# Patient Record
Sex: FEMALE | Race: OTHER | HISPANIC OR LATINO | Employment: FULL TIME | URBAN - METROPOLITAN AREA
[De-identification: names, ages, dates, MRNs, and addresses within clinical notes are randomized per-mention and may not be internally consistent; named-entity substitution may affect disease eponyms.]

---

## 2018-01-19 ENCOUNTER — OFFICE VISIT (OUTPATIENT)
Dept: OBSTETRICS AND GYNECOLOGY | Facility: CLINIC | Age: 53
End: 2018-01-19

## 2018-01-19 VITALS
WEIGHT: 145 LBS | BODY MASS INDEX: 26.68 KG/M2 | SYSTOLIC BLOOD PRESSURE: 122 MMHG | HEIGHT: 62 IN | DIASTOLIC BLOOD PRESSURE: 70 MMHG

## 2018-01-19 DIAGNOSIS — Z00.00 ANNUAL PHYSICAL EXAM: Primary | ICD-10-CM

## 2018-01-19 DIAGNOSIS — N95.1 PERIMENOPAUSAL VASOMOTOR SYMPTOMS: ICD-10-CM

## 2018-01-19 LAB
B-HCG UR QL: NEGATIVE
BILIRUB BLD-MCNC: NEGATIVE MG/DL
CLARITY, POC: CLEAR
COLOR UR: YELLOW
GLUCOSE UR STRIP-MCNC: NEGATIVE MG/DL
INTERNAL NEGATIVE CONTROL: NEGATIVE
INTERNAL POSITIVE CONTROL: POSITIVE
KETONES UR QL: NEGATIVE
LEUKOCYTE EST, POC: NEGATIVE
Lab: 0.32
NITRITE UR-MCNC: NEGATIVE MG/ML
PH UR: 5 [PH] (ref 5–8)
PROT UR STRIP-MCNC: NEGATIVE MG/DL
RBC # UR STRIP: NEGATIVE /UL
SP GR UR: 1 (ref 1–1.03)
UROBILINOGEN UR QL: NORMAL

## 2018-01-19 PROCEDURE — 81025 URINE PREGNANCY TEST: CPT | Performed by: OBSTETRICS & GYNECOLOGY

## 2018-01-19 PROCEDURE — 81002 URINALYSIS NONAUTO W/O SCOPE: CPT | Performed by: OBSTETRICS & GYNECOLOGY

## 2018-01-19 PROCEDURE — 99386 PREV VISIT NEW AGE 40-64: CPT | Performed by: OBSTETRICS & GYNECOLOGY

## 2018-01-19 PROCEDURE — 99213 OFFICE O/P EST LOW 20 MIN: CPT | Performed by: OBSTETRICS & GYNECOLOGY

## 2018-01-19 RX ORDER — ESTRADIOL 1 MG/1
1 TABLET ORAL DAILY
Qty: 90 TABLET | Refills: 3 | Status: SHIPPED | OUTPATIENT
Start: 2018-01-19 | End: 2018-03-23 | Stop reason: SDUPTHER

## 2018-01-19 NOTE — PROGRESS NOTES
GYN Annual Exam     CC- Here for annual exam.     Dianna Gould is a 52 y.o. female who presents for annual well woman exam. Pt is a new patient. Periods are irregular, lasting 4 days. Will skip months as long as 3 months at a time. Dysmenorrhea:none. Cyclic symptoms include none. No intermenstrual bleeding, spotting, or discharge. Vasomotor symptoms: yes. Pt has no hx of HRT use. Having vasomotor symptoms at night and causing her to wake at night.    OB History      Para Term  AB Living    3 3 3   3    SAB TAB Ectopic Multiple Live Births        3          Current contraception: perimenopause  History of abnormal Pap smear: no  History of abnormal mammogram: no  Family history of uterine, colon or ovarian cancer: no  Family history of breast cancer: no    Health Maintenance   Topic Date Due   • TDAP/TD VACCINES (1 - Tdap) 1983   • INFLUENZA VACCINE  2017   • HEPATITIS C SCREENING  2018   • MAMMOGRAM  2018   • PAP SMEAR  2018   • COLONOSCOPY  2018       History reviewed. No pertinent past medical history.    History reviewed. No pertinent surgical history.      Current Outpatient Prescriptions:   •  Ped Multivitamins-Fl-Iron (MULTIVITAMIN WITH FLUORIDE/IRON) 0.25-10 MG/ML solution solution, Take  by mouth Daily., Disp: , Rfl:   •  estradiol (ESTRACE) 1 MG tablet, Take 1 tablet by mouth Daily., Disp: 90 tablet, Rfl: 3  •  progesterone (PROMETRIUM) 100 MG capsule, Take 1 capsule by mouth Every Night., Disp: 90 capsule, Rfl: 3    No Known Allergies    Social History   Substance Use Topics   • Smoking status: Never Smoker   • Smokeless tobacco: None   • Alcohol use No       History reviewed. No pertinent family history.    Review of Systems   Constitutional: Negative for unexpected weight change.   Gastrointestinal: Negative for abdominal distention and abdominal pain.   Endocrine: Negative for heat intolerance.   Genitourinary: Negative for menstrual problem,  "pelvic pain, vaginal bleeding and vaginal discharge.       /70  Ht 157.5 cm (62\")  Wt 65.8 kg (145 lb)  Breastfeeding? No  BMI 26.52 kg/m2    Physical Exam   Constitutional: She is oriented to person, place, and time. She appears well-developed and well-nourished.   HENT:   Mouth/Throat: Normal dentition. No dental caries.   Cardiovascular: Normal rate and regular rhythm.    Pulmonary/Chest: Effort normal and breath sounds normal. Right breast exhibits no inverted nipple, no mass, no nipple discharge, no skin change and no tenderness. Left breast exhibits no inverted nipple, no mass, no nipple discharge, no skin change and no tenderness.   Abdominal: Soft. She exhibits no distension and no mass. There is no tenderness.   Genitourinary: There is no rash, tenderness or lesion on the right labia. There is no rash, tenderness or lesion on the left labia. Uterus is not deviated, not enlarged, not fixed and not tender. Cervix exhibits no motion tenderness, no discharge and no friability. Right adnexum displays no mass, no tenderness and no fullness. Left adnexum displays no mass, no tenderness and no fullness. No tenderness or bleeding in the vagina. No vaginal discharge found.   Neurological: She is alert and oriented to person, place, and time.   Psychiatric: She has a normal mood and affect. Her behavior is normal. Judgment and thought content normal.   Vitals reviewed.         Assessment/Plan    1) GYN HM: Check pap smear. Check MMG. Had a Cologard test. HAs not had a colonoscopy.  SBE demonstrated and encouraged.  2) Perimenopause: having vasomotor symptoms. Desires treatment. Discussed Brisdelle versus HRT. Pt desires HRT. Rx estradiol 1mg and prometrium 100mg qhs. FU 8 weeks for fu.  3) Bone health - Weight bearing exercise, dietary calcium recommendations and vitamin D reviewed.   4) Diet and Exercise discussed  5) Smoking Status: nonsmoker  6) Social: Has a 9 year old son. Has a daughter that is a " missionary and another daughter that is a farmer.  7) Follow up prn and 1 year       Diagnoses and all orders for this visit:    Annual physical exam  -     POC Urinalysis Dipstick  -     POC Pregnancy, Urine  -     Pap IG, HPV-hr - ThinPrep Vial, Cervix  -     Mammo Screening Bilateral With CAD; Future    Perimenopausal vasomotor symptoms    Other orders  -     Ped Multivitamins-Fl-Iron (MULTIVITAMIN WITH FLUORIDE/IRON) 0.25-10 MG/ML solution solution; Take  by mouth Daily.  -     estradiol (ESTRACE) 1 MG tablet; Take 1 tablet by mouth Daily.  -     progesterone (PROMETRIUM) 100 MG capsule; Take 1 capsule by mouth Every Night.        Lali Peter, DO  1/20/2018  4:57 PM

## 2018-01-23 LAB
CYTOLOGIST CVX/VAG CYTO: NORMAL
CYTOLOGY CVX/VAG DOC THIN PREP: NORMAL
DX ICD CODE: NORMAL
HIV 1 & 2 AB SER-IMP: NORMAL
HPV I/H RISK 1 DNA CVX QL PROBE+SIG AMP: NEGATIVE
OTHER STN SPEC: NORMAL
PATH REPORT.FINAL DX SPEC: NORMAL
STAT OF ADQ CVX/VAG CYTO-IMP: NORMAL

## 2018-02-06 ENCOUNTER — HOSPITAL ENCOUNTER (OUTPATIENT)
Dept: MAMMOGRAPHY | Facility: HOSPITAL | Age: 53
Discharge: HOME | End: 2018-02-06
Attending: OBSTETRICS & GYNECOLOGY | Admitting: OBSTETRICS & GYNECOLOGY

## 2018-02-06 DIAGNOSIS — Z00.00 ANNUAL PHYSICAL EXAM: ICD-10-CM

## 2018-02-06 PROCEDURE — 77067 SCR MAMMO BI INCL CAD: CPT

## 2018-03-23 ENCOUNTER — OFFICE VISIT (OUTPATIENT)
Dept: OBSTETRICS AND GYNECOLOGY | Facility: CLINIC | Age: 53
End: 2018-03-23

## 2018-03-23 VITALS
SYSTOLIC BLOOD PRESSURE: 122 MMHG | HEIGHT: 62 IN | BODY MASS INDEX: 26.13 KG/M2 | DIASTOLIC BLOOD PRESSURE: 70 MMHG | WEIGHT: 142 LBS

## 2018-03-23 DIAGNOSIS — N95.1 PERIMENOPAUSAL VASOMOTOR SYMPTOMS: Primary | ICD-10-CM

## 2018-03-23 PROCEDURE — 99213 OFFICE O/P EST LOW 20 MIN: CPT | Performed by: OBSTETRICS & GYNECOLOGY

## 2018-03-23 RX ORDER — ESTRADIOL 1 MG/1
1 TABLET ORAL DAILY
Qty: 90 TABLET | Refills: 3 | Status: SHIPPED | OUTPATIENT
Start: 2018-03-23 | End: 2018-04-16 | Stop reason: SDUPTHER

## 2018-03-23 NOTE — PROGRESS NOTES
"PROBLEM VISIT    Chief Complaint: Vasomotor symptoms fu      Dianna Gould is a 52 y.o. patient who presents for follow up of HRT. Pt is doing well on HRT. Pt is happy with the meds and states her vasomotor symptoms have resolved. Pt has less of a foggy brain. Pt is still struggling with sleeping and wakes up numerous times and cannot fall back to sleep. Pt has taken Ambien in the past and desires a script. She gets too tired on Benadryl.  Chief Complaint   Patient presents with   • Follow-up             The following portions of the patient's history were reviewed and updated as appropriate: allergies, current medications and problem list.    Review of Systems   Constitutional: Positive for fatigue.   Endocrine: Negative for cold intolerance and heat intolerance.   Genitourinary: Negative for vaginal bleeding.       /70   Ht 157.5 cm (62\")   Wt 64.4 kg (142 lb)   LMP 03/12/2018   Breastfeeding? No   BMI 25.97 kg/m²     Physical Exam   Constitutional: She is oriented to person, place, and time. She appears well-developed and well-nourished.   Neurological: She is alert and oriented to person, place, and time.   Psychiatric: She has a normal mood and affect. Her behavior is normal. Judgment and thought content normal.   Vitals reviewed.        Assessment/Plan   Dianna was seen today for follow-up.    Diagnoses and all orders for this visit:    Perimenopausal vasomotor symptoms      51yo for fu of vasomotor symptoms and difficulty sleeping    1) gyn HM: LPS/MMG 1/2018    2) vasomotor symptoms: Doing very well on HRT and desires to continue. BP normal. No PMPB. Refills given.    3) Fatigue: pt is still struggling w sleep. Wakes numerous times ancannot get back to sleep. Has taken Ambien in the past and desires a script to take prn. Rx Ambien 5mg po qhs #30. No refills.             No Follow-up on file.      Lali Peter,     3/23/2018  9:50 AM  "

## 2018-04-17 RX ORDER — ESTRADIOL 1 MG/1
1 TABLET ORAL DAILY
Qty: 90 TABLET | Refills: 3 | Status: SHIPPED | OUTPATIENT
Start: 2018-04-17 | End: 2019-01-22 | Stop reason: SDUPTHER

## 2019-01-22 ENCOUNTER — OFFICE VISIT (OUTPATIENT)
Dept: OBSTETRICS AND GYNECOLOGY | Facility: CLINIC | Age: 54
End: 2019-01-22

## 2019-01-22 VITALS
WEIGHT: 143 LBS | SYSTOLIC BLOOD PRESSURE: 118 MMHG | BODY MASS INDEX: 26.31 KG/M2 | DIASTOLIC BLOOD PRESSURE: 62 MMHG | HEIGHT: 62 IN

## 2019-01-22 DIAGNOSIS — N95.1 VASOMOTOR SYMPTOMS DUE TO MENOPAUSE: ICD-10-CM

## 2019-01-22 DIAGNOSIS — Z13.9 SCREENING FOR CONDITION: ICD-10-CM

## 2019-01-22 DIAGNOSIS — Z01.419 ENCOUNTER FOR GYNECOLOGICAL EXAMINATION WITHOUT ABNORMAL FINDING: Primary | ICD-10-CM

## 2019-01-22 LAB
BILIRUB BLD-MCNC: NEGATIVE MG/DL
CLARITY, POC: CLEAR
COLOR UR: YELLOW
GLUCOSE UR STRIP-MCNC: NEGATIVE MG/DL
KETONES UR QL: NEGATIVE
LEUKOCYTE EST, POC: NEGATIVE
NITRITE UR-MCNC: NEGATIVE MG/ML
PH UR: 5 [PH] (ref 5–8)
PROT UR STRIP-MCNC: NEGATIVE MG/DL
RBC # UR STRIP: NEGATIVE /UL
SP GR UR: 1 (ref 1–1.03)
UROBILINOGEN UR QL: NORMAL

## 2019-01-22 PROCEDURE — 81002 URINALYSIS NONAUTO W/O SCOPE: CPT | Performed by: OBSTETRICS & GYNECOLOGY

## 2019-01-22 PROCEDURE — 99396 PREV VISIT EST AGE 40-64: CPT | Performed by: OBSTETRICS & GYNECOLOGY

## 2019-01-22 RX ORDER — ESTRADIOL 1 MG/1
1 TABLET ORAL DAILY
Qty: 90 TABLET | Refills: 3 | Status: SHIPPED | OUTPATIENT
Start: 2019-01-22 | End: 2019-03-15

## 2019-01-22 NOTE — PROGRESS NOTES
GYN Annual Exam     CC- Here for annual exam.     Dianna Gould is a 53 y.o. female who presents for annual well woman exam. Pt was having irregular cycles approx every 3 months. Last cycle was 2018 and was a normal cycle. Pt is on HRT and doing very well. No abnormal bleeding. Pt desires to continue HRT. Pt has done Cologard but never had a colonoscopy.     OB History      Para Term  AB Living    3 3 3     3    SAB TAB Ectopic Molar Multiple Live Births              3          Current contraception: post menopausal status  History of abnormal Pap smear: no  History of abnormal mammogram: no  Family history of uterine, colon or ovarian cancer: no  Family history of breast cancer: no        Health Maintenance   Topic Date Due   • TDAP/TD VACCINES (1 - Tdap) 1983   • ZOSTER VACCINE (1 of 2) 2014   • HEPATITIS C SCREENING  2018   • COLONOSCOPY  2018   • INFLUENZA VACCINE  2018   • ANNUAL PHYSICAL  2019   • MAMMOGRAM  2020   • PAP SMEAR  2021       History reviewed. No pertinent past medical history.    History reviewed. No pertinent surgical history.      Current Outpatient Medications:   •  cefdinir (OMNICEF) 300 MG capsule, Take 1 capsule by mouth 2 (Two) Times a Day., Disp: 20 capsule, Rfl: 0  •  estradiol (ESTRACE) 1 MG tablet, Take 1 tablet by mouth Daily., Disp: 90 tablet, Rfl: 3  •  fluticasone (FLONASE) 50 MCG/ACT nasal spray, 2 sprays into each nostril Daily., Disp: 1 bottle, Rfl: 0  •  Meloxicam (MOBIC PO), Take  by mouth., Disp: , Rfl:   •  Ped Multivitamins-Fl-Iron (MULTIVITAMIN WITH FLUORIDE/IRON) 0.25-10 MG/ML solution solution, Take  by mouth Daily., Disp: , Rfl:   •  progesterone (PROMETRIUM) 100 MG capsule, Take 1 capsule by mouth Every Night., Disp: 90 capsule, Rfl: 3    No Known Allergies    Social History     Tobacco Use   • Smoking status: Never Smoker   • Smokeless tobacco: Never Used   Substance Use Topics   • Alcohol use: No  "  • Drug use: No       Family History   Problem Relation Age of Onset   • No Known Problems Mother    • No Known Problems Father    • No Known Problems Sister    • No Known Problems Brother    • No Known Problems Son    • No Known Problems Daughter    • No Known Problems Maternal Grandmother    • No Known Problems Maternal Grandfather    • No Known Problems Paternal Grandmother    • No Known Problems Paternal Grandfather    • No Known Problems Cousin        Review of Systems   Constitutional: Negative for unexpected weight change.   Gastrointestinal: Negative for abdominal distention, abdominal pain, anal bleeding, blood in stool, constipation and diarrhea.   Genitourinary: Negative for dyspareunia, menstrual problem, pelvic pain, vaginal bleeding, vaginal discharge and vaginal pain.       /62   Ht 157.5 cm (62\")   Wt 64.9 kg (143 lb)   LMP 03/12/2018   BMI 26.16 kg/m²     Physical Exam   Constitutional: She is oriented to person, place, and time. She appears well-developed and well-nourished.   HENT:   Mouth/Throat: Normal dentition. No dental caries.   Cardiovascular: Normal rate and regular rhythm.   Pulmonary/Chest: Effort normal and breath sounds normal. Right breast exhibits no inverted nipple, no mass, no nipple discharge, no skin change and no tenderness. Left breast exhibits no inverted nipple, no mass, no nipple discharge, no skin change and no tenderness.   Abdominal: Soft. She exhibits no distension and no mass. There is no tenderness.   Genitourinary: There is no rash, tenderness or lesion on the right labia. There is no rash, tenderness or lesion on the left labia. Uterus is not deviated, not enlarged, not fixed and not tender. Cervix exhibits no motion tenderness, no discharge and no friability. Right adnexum displays no mass, no tenderness and no fullness. Left adnexum displays no mass, no tenderness and no fullness. No tenderness or bleeding in the vagina. No vaginal discharge found. "   Neurological: She is alert and oriented to person, place, and time.   Psychiatric: She has a normal mood and affect. Her behavior is normal. Judgment and thought content normal.   Vitals reviewed.         Assessment/Plan    1) GYN HM: LPS 1/2018 normal w neg HR HPV   SBE demonstrated and encouraged.  2) PerimenopauseMenopausal: On HRT and doing well. No PMPB.  3) Bone health - Weight bearing exercise, dietary calcium recommendations and vitamin D reviewed.   4) Diet and Exercise discussed  5) Smoking Status: nonsmoker  6) Social: Daughter was a missionary in [facility]. 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 Pt has a 10 year old son. Has another daughter that is a farmer.  7) Follow up prn and 1 year       Diagnoses and all orders for this visit:    Encounter for gynecological examination without abnormal finding    Screening for condition  -     POC Urinalysis Dipstick  -     Cancel: POC Urinalysis Dipstick        Lali Peter,   1/22/2019  12:52 PM

## 2019-02-05 ENCOUNTER — HOSPITAL ENCOUNTER (OUTPATIENT)
Dept: MAMMOGRAPHY | Facility: HOSPITAL | Age: 54
Discharge: HOME | End: 2019-02-05
Admitting: OBSTETRICS & GYNECOLOGY

## 2019-02-05 DIAGNOSIS — Z01.419 ENCOUNTER FOR GYNECOLOGICAL EXAMINATION WITHOUT ABNORMAL FINDING: ICD-10-CM

## 2019-02-05 PROCEDURE — 77067 SCR MAMMO BI INCL CAD: CPT

## 2019-03-15 RX ORDER — ESTRADIOL 1 MG/1
TABLET ORAL
Qty: 90 TABLET | Refills: 3 | Status: SHIPPED | OUTPATIENT
Start: 2019-03-15 | End: 2020-01-28 | Stop reason: SDUPTHER

## 2020-01-28 ENCOUNTER — OFFICE VISIT (OUTPATIENT)
Dept: OBSTETRICS AND GYNECOLOGY | Facility: CLINIC | Age: 55
End: 2020-01-28

## 2020-01-28 VITALS
SYSTOLIC BLOOD PRESSURE: 108 MMHG | BODY MASS INDEX: 27.05 KG/M2 | DIASTOLIC BLOOD PRESSURE: 70 MMHG | HEIGHT: 62 IN | WEIGHT: 147 LBS

## 2020-01-28 DIAGNOSIS — Z01.419 WELL WOMAN EXAM: Primary | ICD-10-CM

## 2020-01-28 DIAGNOSIS — N95.1 VASOMOTOR SYMPTOMS DUE TO MENOPAUSE: ICD-10-CM

## 2020-01-28 PROCEDURE — 81002 URINALYSIS NONAUTO W/O SCOPE: CPT | Performed by: OBSTETRICS & GYNECOLOGY

## 2020-01-28 PROCEDURE — 99396 PREV VISIT EST AGE 40-64: CPT | Performed by: OBSTETRICS & GYNECOLOGY

## 2020-01-28 RX ORDER — ESTRADIOL 1 MG/1
1 TABLET ORAL DAILY
Qty: 90 TABLET | Refills: 3 | Status: SHIPPED | OUTPATIENT
Start: 2020-01-28 | End: 2020-05-26 | Stop reason: SDUPTHER

## 2020-01-28 NOTE — PROGRESS NOTES
GYN Annual Exam     CC- Here for annual exam.     Dianna Gould is a 54 y.o. female who presents for annual well woman exam.  Patient has not had a cycle since 2018.  Patient is on HRT and doing very well.  She desires to continue estradiol 1 mg p.o. daily and Prometrium 100 mg p.o. nightly.  She denies any postmenopausal bleeding.  She denies any vasomotor symptoms.  She has had no change in her past medical history or her past family history.    OB History        3    Para   3    Term   3            AB        Living   3       SAB        TAB        Ectopic        Molar        Multiple        Live Births   3                Current contraception: post menopausal status  History of abnormal Pap smear: no  History of abnormal mammogram: no  Family history of uterine, colon or ovarian cancer: no  Family history of breast cancer: no    Health Maintenance   Topic Date Due   • ZOSTER VACCINE (1 of 2) 2014   • HEPATITIS C SCREENING  2018   • COLONOSCOPY  2018   • ANNUAL PHYSICAL  2019   • INFLUENZA VACCINE  2019   • Annual Gynecologic Pelvic and Breast Exam  2021   • MAMMOGRAM  2021   • PAP SMEAR  2022   • TDAP/TD VACCINES (2 - Td) 03/10/2022       History reviewed. No pertinent past medical history.    History reviewed. No pertinent surgical history.      Current Outpatient Medications:   •  estradiol (ESTRACE) 1 MG tablet, Take 1 tablet by mouth Daily., Disp: 90 tablet, Rfl: 3  •  Ped Multivitamins-Fl-Iron (MULTIVITAMIN WITH FLUORIDE/IRON) 0.25-10 MG/ML solution solution, Take  by mouth Daily., Disp: , Rfl:   •  progesterone (PROMETRIUM) 100 MG capsule, Take 1 capsule by mouth Every Night., Disp: 30 capsule, Rfl: 11    No Known Allergies    Social History     Tobacco Use   • Smoking status: Never Smoker   • Smokeless tobacco: Never Used   Substance Use Topics   • Alcohol use: No   • Drug use: No       Family History   Problem Relation Age of Onset  "  • No Known Problems Mother    • No Known Problems Father    • No Known Problems Sister    • No Known Problems Brother    • No Known Problems Son    • No Known Problems Daughter    • No Known Problems Maternal Grandmother    • No Known Problems Maternal Grandfather    • No Known Problems Paternal Grandmother    • No Known Problems Paternal Grandfather    • No Known Problems Cousin    • Breast cancer Neg Hx        Review of Systems   Constitutional: Negative for appetite change, chills, fatigue, fever and unexpected weight change.   Gastrointestinal: Negative for abdominal distention, abdominal pain, anal bleeding, blood in stool, constipation, diarrhea, nausea and vomiting.   Genitourinary: Negative for dyspareunia, dysuria, menstrual problem, pelvic pain, vaginal bleeding, vaginal discharge and vaginal pain.       /70   Ht 157.5 cm (62\")   Wt 66.7 kg (147 lb)   LMP 03/12/2018   BMI 26.89 kg/m²     Physical Exam   Constitutional: She is oriented to person, place, and time. She appears well-developed and well-nourished.   HENT:   Mouth/Throat: Normal dentition. No dental caries.   Cardiovascular: Normal rate, regular rhythm and normal heart sounds.   Pulmonary/Chest: Effort normal and breath sounds normal. No stridor. No respiratory distress. She has no wheezes. Right breast exhibits no inverted nipple, no mass, no nipple discharge, no skin change and no tenderness. Left breast exhibits no inverted nipple, no mass, no nipple discharge, no skin change and no tenderness.   Abdominal: Soft. She exhibits no distension and no mass. There is no tenderness.   Genitourinary: There is no rash, tenderness or lesion on the right labia. There is no rash, tenderness or lesion on the left labia. Uterus is not deviated, not enlarged, not fixed and not tender. Cervix exhibits no motion tenderness, no discharge and no friability. Right adnexum displays no mass, no tenderness and no fullness. Left adnexum displays no mass, " no tenderness and no fullness. No tenderness or bleeding in the vagina. No vaginal discharge found.   Musculoskeletal: Normal range of motion. She exhibits no edema or tenderness.   Neurological: She is alert and oriented to person, place, and time. No cranial nerve deficit. Coordination normal.   Skin: Skin is warm. No rash noted. No erythema.   Psychiatric: She has a normal mood and affect. Her behavior is normal. Judgment and thought content normal.   Vitals reviewed.         Assessment/Plan    1) GYN HM: LPS 1/2018 normal w neg HR HPV. Last MMG 2/2019. SBE demonstrated and encouraged.  Has never had a colonoscopy.  Cologuard is negative patient.  2) :  On HRT and doing well. No PMPB.  No vasomotor symptoms.  3) Bone health - Weight bearing exercise, dietary calcium recommendations and vitamin D reviewed.   4) Diet and Exercise discussed  5) Smoking Status: nonsmoker  6) Social: Daughter was a missionary in [facility]. 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  Pt has an 11 year old son. Has another daughter that is dairy to a farmer  7) Follow up prn and 1 year       Diagnoses and all orders for this visit:    Well woman exam  -     POC Urinalysis Dipstick    Vasomotor symptoms due to menopause    Other orders  -     estradiol (ESTRACE) 1 MG tablet; Take 1 tablet by mouth Daily.  -     progesterone (PROMETRIUM) 100 MG capsule; Take 1 capsule by mouth Every Night.        Lali Peter, DO  1/28/2020  2:13 PM

## 2020-05-26 RX ORDER — ESTRADIOL 1 MG/1
1 TABLET ORAL DAILY
Qty: 90 TABLET | Refills: 3 | Status: SHIPPED | OUTPATIENT
Start: 2020-05-26 | End: 2021-03-25 | Stop reason: SDUPTHER

## 2021-03-26 RX ORDER — ESTRADIOL 1 MG/1
1 TABLET ORAL DAILY
Qty: 90 TABLET | Refills: 0 | Status: SHIPPED | OUTPATIENT
Start: 2021-03-26 | End: 2021-03-30 | Stop reason: SDUPTHER

## 2021-03-30 ENCOUNTER — OFFICE VISIT (OUTPATIENT)
Dept: OBSTETRICS AND GYNECOLOGY | Facility: CLINIC | Age: 56
End: 2021-03-30

## 2021-03-30 VITALS
SYSTOLIC BLOOD PRESSURE: 126 MMHG | HEIGHT: 63 IN | WEIGHT: 146.2 LBS | DIASTOLIC BLOOD PRESSURE: 86 MMHG | BODY MASS INDEX: 25.91 KG/M2

## 2021-03-30 DIAGNOSIS — Z01.419 ROUTINE GYNECOLOGICAL EXAMINATION: Primary | ICD-10-CM

## 2021-03-30 DIAGNOSIS — N95.1 VASOMOTOR SYMPTOMS DUE TO MENOPAUSE: ICD-10-CM

## 2021-03-30 DIAGNOSIS — Z13.9 SCREENING FOR UNSPECIFIED CONDITION: ICD-10-CM

## 2021-03-30 DIAGNOSIS — Z11.51 SPECIAL SCREENING EXAMINATION FOR HUMAN PAPILLOMAVIRUS (HPV): ICD-10-CM

## 2021-03-30 DIAGNOSIS — Z01.419 PAP SMEAR, LOW-RISK: ICD-10-CM

## 2021-03-30 PROCEDURE — 81002 URINALYSIS NONAUTO W/O SCOPE: CPT | Performed by: OBSTETRICS & GYNECOLOGY

## 2021-03-30 PROCEDURE — 99396 PREV VISIT EST AGE 40-64: CPT | Performed by: OBSTETRICS & GYNECOLOGY

## 2021-03-30 RX ORDER — ESTRADIOL 1 MG/1
1 TABLET ORAL DAILY
Qty: 90 TABLET | Refills: 3 | Status: SHIPPED | OUTPATIENT
Start: 2021-03-30 | End: 2022-04-10

## 2021-03-30 NOTE — PROGRESS NOTES
GYN Annual Exam     CC- Here for annual exam.     Dianna Gould is a 55 y.o. female who presents for annual well woman exam. Patient has not had a cycle since 2018.  Patient is on HRT and doing very well.  She desires to continue estradiol 1 mg p.o. daily and Prometrium 100 mg p.o. nightly.  She denies any postmenopausal bleeding.  She denies any vasomotor symptoms.  She has had no change in her past medical history or her past family history.    OB History        3    Para   3    Term   3            AB        Living   3       SAB        TAB        Ectopic        Molar        Multiple        Live Births   3              Current contraception: post menopausal status  History of abnormal Pap smear: no  History of abnormal mammogram: no  Family history of uterine, colon or ovarian cancer: no  Family history of breast cancer: no    Health Maintenance   Topic Date Due   • COLONOSCOPY  Never done   • ZOSTER VACCINE (1 of 2) Never done   • HEPATITIS C SCREENING  Never done   • ANNUAL PHYSICAL  2019   • INFLUENZA VACCINE  Never done   • Annual Gynecologic Pelvic and Breast Exam  2021   • MAMMOGRAM  2021   • PAP SMEAR  2022   • TDAP/TD VACCINES (2 - Td) 03/10/2022   • Pneumococcal Vaccine 0-64  Aged Out   • MENINGOCOCCAL VACCINE  Aged Out       History reviewed. No pertinent past medical history.    History reviewed. No pertinent surgical history.      Current Outpatient Medications:   •  estradiol (ESTRACE) 1 MG tablet, Take 1 tablet by mouth Daily., Disp: 90 tablet, Rfl: 3  •  Ped Multivitamins-Fl-Iron (MULTIVITAMIN WITH FLUORIDE/IRON) 0.25-10 MG/ML solution solution, Take  by mouth Daily., Disp: , Rfl:   •  progesterone (PROMETRIUM) 100 MG capsule, Take 1 capsule by mouth Every Night., Disp: 90 capsule, Rfl: 3    No Known Allergies    Social History     Tobacco Use   • Smoking status: Never Smoker   • Smokeless tobacco: Never Used   Substance Use Topics   • Alcohol use:  "No   • Drug use: No       Family History   Problem Relation Age of Onset   • No Known Problems Mother    • No Known Problems Father    • No Known Problems Sister    • No Known Problems Brother    • No Known Problems Son    • No Known Problems Daughter    • No Known Problems Maternal Grandmother    • No Known Problems Maternal Grandfather    • No Known Problems Paternal Grandmother    • No Known Problems Paternal Grandfather    • No Known Problems Cousin    • Breast cancer Neg Hx        Review of Systems   Constitutional: Negative for appetite change, chills, fatigue, fever and unexpected weight change.   Gastrointestinal: Negative for abdominal distention, abdominal pain, anal bleeding, blood in stool, constipation, diarrhea, nausea and vomiting.   Genitourinary: Negative for dyspareunia, dysuria, menstrual problem, pelvic pain, vaginal bleeding, vaginal discharge and vaginal pain.       /86   Ht 158.8 cm (62.5\")   Wt 66.3 kg (146 lb 3.2 oz)   LMP 03/12/2018   BMI 26.31 kg/m²     Physical Exam  Vitals reviewed.   Constitutional:       Appearance: She is well-developed.   HENT:      Mouth/Throat:      Dentition: Normal dentition. No dental caries.   Cardiovascular:      Rate and Rhythm: Normal rate and regular rhythm.      Heart sounds: Normal heart sounds.   Pulmonary:      Effort: Pulmonary effort is normal. No respiratory distress.      Breath sounds: Normal breath sounds. No stridor. No wheezing.   Chest:      Breasts:         Right: No inverted nipple, mass, nipple discharge, skin change or tenderness.         Left: No inverted nipple, mass, nipple discharge, skin change or tenderness.   Abdominal:      General: There is no distension.      Palpations: Abdomen is soft. There is no mass.      Tenderness: There is no abdominal tenderness.   Genitourinary:     Labia:         Right: No rash, tenderness or lesion.         Left: No rash, tenderness or lesion.       Vagina: No vaginal discharge, tenderness or " bleeding.      Cervix: No cervical motion tenderness, discharge or friability.      Uterus: Not deviated, not enlarged, not fixed and not tender.       Adnexa:         Right: No mass, tenderness or fullness.          Left: No mass, tenderness or fullness.     Musculoskeletal:         General: No tenderness. Normal range of motion.   Skin:     General: Skin is warm.      Findings: No erythema or rash.   Neurological:      Mental Status: She is alert and oriented to person, place, and time.      Cranial Nerves: No cranial nerve deficit.      Coordination: Coordination normal.   Psychiatric:         Behavior: Behavior normal.         Thought Content: Thought content normal.         Judgment: Judgment normal.            Assessment/Plan    1) GYN HM: LPS 1/2018 normal w neg HR HPV. Last MMG 2/2019. SBE demonstrated and encouraged.  Has never had a colonoscopy.  Cologuard is negative patient.  2) :  On HRT and doing well. No PMPB.  No vasomotor symptoms. Pt does not want to start weaning this year.  3) Bone health - Weight bearing exercise, dietary calcium recommendations and vitamin D reviewed.   4) Diet and Exercise discussed  5) Smoking Status: nonsmoker  6) Social: Daughter was a missionary in [facility]. 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  Pt has an 12 year old son. Has another daughter that is dairy to a farmer  7) Follow up prn and 1 year       Diagnoses and all orders for this visit:    Routine gynecological examination  -     IgP, Aptima HPV    Screening for unspecified condition  -     POC Urinalysis Dipstick  -     Mammo Screening Bilateral With CAD; Future    Pap smear, low-risk  -     IgP, Aptima HPV    Special screening examination for human papillomavirus (HPV)  -     IgP, Aptima HPV    Vasomotor symptoms due to menopause    Other orders  -     estradiol (ESTRACE) 1 MG tablet; Take 1 tablet by mouth Daily.  -      progesterone (PROMETRIUM) 100 MG capsule; Take 1 capsule by mouth Every Night.        Lali Peter DO  3/30/2021  16:20 EDT

## 2021-04-01 LAB
CYTOLOGIST CVX/VAG CYTO: NORMAL
CYTOLOGY CVX/VAG DOC CYTO: NORMAL
CYTOLOGY CVX/VAG DOC THIN PREP: NORMAL
DX ICD CODE: NORMAL
HIV 1 & 2 AB SER-IMP: NORMAL
HPV I/H RISK 4 DNA CVX QL PROBE+SIG AMP: NEGATIVE
OTHER STN SPEC: NORMAL
STAT OF ADQ CVX/VAG CYTO-IMP: NORMAL

## 2021-05-06 ENCOUNTER — HOSPITAL ENCOUNTER (OUTPATIENT)
Dept: MAMMOGRAPHY | Facility: HOSPITAL | Age: 56
Discharge: HOME | End: 2021-05-06
Admitting: OBSTETRICS & GYNECOLOGY

## 2021-05-06 DIAGNOSIS — Z13.9 SCREENING FOR UNSPECIFIED CONDITION: ICD-10-CM

## 2021-05-06 PROCEDURE — 77067 SCR MAMMO BI INCL CAD: CPT

## 2021-05-06 PROCEDURE — 77063 BREAST TOMOSYNTHESIS BI: CPT

## 2021-11-10 RX ORDER — PROGESTERONE 100 MG/1
100 CAPSULE ORAL NIGHTLY
Qty: 30 CAPSULE | Refills: 0 | Status: SHIPPED | OUTPATIENT
Start: 2021-11-10 | End: 2022-04-12

## 2022-01-19 ENCOUNTER — OFFICE VISIT (OUTPATIENT)
Dept: OBSTETRICS AND GYNECOLOGY | Facility: CLINIC | Age: 57
End: 2022-01-19

## 2022-01-19 VITALS
WEIGHT: 146 LBS | DIASTOLIC BLOOD PRESSURE: 72 MMHG | SYSTOLIC BLOOD PRESSURE: 106 MMHG | HEIGHT: 63 IN | BODY MASS INDEX: 25.87 KG/M2

## 2022-01-19 DIAGNOSIS — N95.1 VASOMOTOR SYMPTOMS DUE TO MENOPAUSE: ICD-10-CM

## 2022-01-19 DIAGNOSIS — Z12.31 ENCOUNTER FOR SCREENING MAMMOGRAM FOR MALIGNANT NEOPLASM OF BREAST: ICD-10-CM

## 2022-01-19 DIAGNOSIS — Z13.9 SCREENING FOR CONDITION: Primary | ICD-10-CM

## 2022-01-19 DIAGNOSIS — R31.9 HEMATURIA, UNSPECIFIED TYPE: ICD-10-CM

## 2022-01-19 DIAGNOSIS — Z79.890 HORMONE REPLACEMENT THERAPY (HRT): ICD-10-CM

## 2022-01-19 LAB
BILIRUB BLD-MCNC: NEGATIVE MG/DL
CLARITY, POC: CLEAR
COLOR UR: YELLOW
GLUCOSE UR STRIP-MCNC: NEGATIVE MG/DL
KETONES UR QL: NEGATIVE
LEUKOCYTE EST, POC: NEGATIVE
NITRITE UR-MCNC: NEGATIVE MG/ML
PH UR: 5 [PH] (ref 5–8)
PROT UR STRIP-MCNC: NEGATIVE MG/DL
RBC # UR STRIP: ABNORMAL /UL
SP GR UR: 1 (ref 1–1.03)
UROBILINOGEN UR QL: NORMAL

## 2022-01-19 PROCEDURE — 81002 URINALYSIS NONAUTO W/O SCOPE: CPT | Performed by: OBSTETRICS & GYNECOLOGY

## 2022-01-19 PROCEDURE — 99213 OFFICE O/P EST LOW 20 MIN: CPT | Performed by: OBSTETRICS & GYNECOLOGY

## 2022-01-19 NOTE — PROGRESS NOTES
"      Dianna Gould is a 56 y.o. patient who presents for follow up of   Chief Complaint   Patient presents with   • Hot Flashes       56-year-old established patient of the practice who is new to me presents for discussion of hot flashes.  She had severe hot flashes and insomnia and was started on Estrace 1 mg daily and Prometrium 100 mg daily.  She did well with that dosage for a long time and her hot flashes were better, however, she still had issues with insomnia.  She had some trouble getting her HRT refilled through the pharmacy and took about a 10-day break.  She notes that during that time she slept \"like a baby\".  She is now wondering if changing or decreasing her hormone dose may help her sleep better.  She ultimately would like to get off hormone replacement therapy but is concerned about hair loss in addition to hot flashes.  She also has hematuria today on her urine dip and has not noticed any urinary symptoms      The following portions of the patient's history were reviewed and updated as appropriate: allergies, current medications and problem list.    Review of Systems   Constitutional: Positive for activity change.   Endocrine: Positive for heat intolerance.   Psychiatric/Behavioral: Positive for sleep disturbance.   All other systems reviewed and are negative.      /72   Ht 158.8 cm (62.5\")   Wt 66.2 kg (146 lb)   LMP 03/12/2018   BMI 26.28 kg/m²     Physical Exam  Vitals and nursing note reviewed.   Constitutional:       Appearance: Normal appearance. She is well-developed.   HENT:      Head: Normocephalic and atraumatic.   Neck:      Thyroid: No thyromegaly.   Skin:     General: Skin is warm and dry.   Neurological:      Mental Status: She is alert and oriented to person, place, and time.   Psychiatric:         Mood and Affect: Mood normal.         Behavior: Behavior normal.         Thought Content: Thought content normal.         Judgment: Judgment normal.         A/P:  1. HRT-she " has longstanding history of insomnia which briefly improved when she took a 10-day break from HRT.  She would like to decrease her dose and see if that helps her sleep better.  Her hot flashes are improved after restarting HRT.  We will decreased her to Estrace 0.5 mg, half a 1 mg tablet by mouth daily.  She will continue on Prometrium 100 mg p.o. nightly.  She will follow-up with Dr. Peter for her annual in early April and recheck of her symptoms.  She is already taking biotin for hair loss.  We also discussed that the COVID pandemic has been a common cause of hair loss.  2.  Hematuria- check UA and CS. Pt asymptomatic  3. MMG UTD 5/2021 B1- sched MMG 5/2022  4. RTO 4/2022 Dr Peter annual and recheck symptoms.     Assessment/Plan   Diagnoses and all orders for this visit:    1. Screening for condition (Primary)  -     POC Urinalysis Dipstick    2. Encounter for screening mammogram for malignant neoplasm of breast  -     Mammo Screening Bilateral With CAD; Future    3. Hematuria, unspecified type  -     Urinalysis With Microscopic - Urine, Clean Catch  -     Urine Culture - Urine, Urine, Clean Catch    4. Vasomotor symptoms due to menopause    5. Hormone replacement therapy (HRT)                 No follow-ups on file.      Katarzyna Lancaster MD    1/19/2022  16:05 EST

## 2022-01-20 LAB
APPEARANCE UR: CLEAR
BACTERIA #/AREA URNS HPF: NORMAL /[HPF]
BACTERIA UR CULT: NORMAL
BACTERIA UR CULT: NORMAL
BILIRUB UR QL STRIP: NEGATIVE
CASTS URNS QL MICRO: NORMAL /LPF
COLOR UR: YELLOW
EPI CELLS #/AREA URNS HPF: NORMAL /HPF (ref 0–10)
GLUCOSE UR QL STRIP: NEGATIVE
HGB UR QL STRIP: NEGATIVE
KETONES UR QL STRIP: NEGATIVE
LEUKOCYTE ESTERASE UR QL STRIP: NEGATIVE
MICRO URNS: NORMAL
MICRO URNS: NORMAL
NITRITE UR QL STRIP: NEGATIVE
PH UR STRIP: 6.5 [PH] (ref 5–7.5)
PROT UR QL STRIP: NEGATIVE
RBC #/AREA URNS HPF: NORMAL /HPF (ref 0–2)
SP GR UR STRIP: 1.01 (ref 1–1.03)
UROBILINOGEN UR STRIP-MCNC: 0.2 MG/DL (ref 0.2–1)
WBC #/AREA URNS HPF: NORMAL /HPF (ref 0–5)

## 2022-04-10 RX ORDER — ESTRADIOL 1 MG/1
TABLET ORAL
Qty: 90 TABLET | Refills: 0 | Status: SHIPPED | OUTPATIENT
Start: 2022-04-10 | End: 2022-10-14

## 2022-04-12 ENCOUNTER — OFFICE VISIT (OUTPATIENT)
Dept: OBSTETRICS AND GYNECOLOGY | Facility: CLINIC | Age: 57
End: 2022-04-12

## 2022-04-12 VITALS
WEIGHT: 146 LBS | BODY MASS INDEX: 25.87 KG/M2 | DIASTOLIC BLOOD PRESSURE: 64 MMHG | HEIGHT: 63 IN | SYSTOLIC BLOOD PRESSURE: 110 MMHG

## 2022-04-12 DIAGNOSIS — Z01.419 PAP SMEAR, LOW-RISK: ICD-10-CM

## 2022-04-12 DIAGNOSIS — Z01.419 ROUTINE GYNECOLOGICAL EXAMINATION: Primary | ICD-10-CM

## 2022-04-12 DIAGNOSIS — N95.1 VASOMOTOR SYMPTOMS DUE TO MENOPAUSE: ICD-10-CM

## 2022-04-12 DIAGNOSIS — Z11.51 SPECIAL SCREENING EXAMINATION FOR HUMAN PAPILLOMAVIRUS (HPV): ICD-10-CM

## 2022-04-12 DIAGNOSIS — Z12.11 COLON CANCER SCREENING: ICD-10-CM

## 2022-04-12 LAB
BILIRUB BLD-MCNC: NEGATIVE MG/DL
CLARITY, POC: CLEAR
COLOR UR: YELLOW
GLUCOSE UR STRIP-MCNC: NEGATIVE MG/DL
KETONES UR QL: NEGATIVE
LEUKOCYTE EST, POC: NEGATIVE
NITRITE UR-MCNC: NEGATIVE MG/ML
PH UR: 5 [PH] (ref 5–8)
PROT UR STRIP-MCNC: NEGATIVE MG/DL
RBC # UR STRIP: NEGATIVE /UL
SP GR UR: 1.03 (ref 1–1.03)
UROBILINOGEN UR QL: NORMAL

## 2022-04-12 PROCEDURE — 81002 URINALYSIS NONAUTO W/O SCOPE: CPT | Performed by: OBSTETRICS & GYNECOLOGY

## 2022-04-12 PROCEDURE — 99396 PREV VISIT EST AGE 40-64: CPT | Performed by: OBSTETRICS & GYNECOLOGY

## 2022-04-12 RX ORDER — ZOLPIDEM TARTRATE 6.25 MG/1
6.25 TABLET, FILM COATED, EXTENDED RELEASE ORAL
COMMUNITY
Start: 2022-03-14

## 2022-04-12 RX ORDER — PROGESTERONE 100 MG/1
100 CAPSULE ORAL DAILY
Qty: 90 CAPSULE | Refills: 3 | Status: SHIPPED | OUTPATIENT
Start: 2022-04-12 | End: 2022-06-22

## 2022-04-12 NOTE — PROGRESS NOTES
GYN Annual Exam     CC- Here for annual exam.     Dianna Gould is a 56 y.o. female who presents for annual well woman exam. Patient has not had a cycle since 2018.  Patient is on HRT and doing very well.  She desires to continue estradiol 1 mg p.o. daily and Prometrium 100 mg p.o. nightly.  She denies any postmenopausal bleeding.  She denies any vasomotor symptoms.  She has had no change in her past medical history or her past family history. Pt is reporting that she is struggling to drink thin liquids- she starts choking- 4x per week over the last month.    OB History        3    Para   3    Term   3            AB        Living   3       SAB        IAB        Ectopic        Molar        Multiple        Live Births   3              Current contraception: post menopausal status  History of abnormal Pap smear: no  History of abnormal mammogram: no  Family history of uterine, colon or ovarian cancer: no  Family history of breast cancer: no    Health Maintenance   Topic Date Due   • COLORECTAL CANCER SCREENING  Never done   • ZOSTER VACCINE (1 of 2) Never done   • HEPATITIS C SCREENING  Never done   • ANNUAL PHYSICAL  2019   • COVID-19 Vaccine (3 - Booster for Pfizer series) 2021   • TDAP/TD VACCINES (2 - Td or Tdap) 03/10/2022   • Annual Gynecologic Pelvic and Breast Exam  2022   • INFLUENZA VACCINE  2022   • MAMMOGRAM  2023   • PAP SMEAR  2024   • Pneumococcal Vaccine 0-64  Aged Out       History reviewed. No pertinent past medical history.    History reviewed. No pertinent surgical history.      Current Outpatient Medications:   •  estradiol (ESTRACE) 1 MG tablet, TAKE 1 TABLET BY MOUTH EVERY DAY, Disp: 90 tablet, Rfl: 0  •  progesterone (PROMETRIUM) 100 MG capsule, Take 1 capsule by mouth Every Night., Disp: 90 capsule, Rfl: 3  •  Progesterone (Prometrium) 100 MG capsule, Take 1 capsule by mouth Daily., Disp: 90 capsule, Rfl: 3  •  zolpidem CR (AMBIEN  "CR) 6.25 MG CR tablet, Take 6.25 mg by mouth every night at bedtime., Disp: , Rfl:     No Known Allergies    Social History     Tobacco Use   • Smoking status: Never Smoker   • Smokeless tobacco: Never Used   Substance Use Topics   • Alcohol use: No   • Drug use: No       Family History   Problem Relation Age of Onset   • No Known Problems Mother    • No Known Problems Father    • No Known Problems Sister    • No Known Problems Brother    • No Known Problems Son    • No Known Problems Daughter    • No Known Problems Maternal Grandmother    • No Known Problems Maternal Grandfather    • No Known Problems Paternal Grandmother    • No Known Problems Paternal Grandfather    • No Known Problems Cousin    • Breast cancer Neg Hx        Review of Systems   Constitutional: Negative for appetite change, chills, fatigue, fever and unexpected weight change.   Gastrointestinal: Negative for abdominal distention, abdominal pain, anal bleeding, blood in stool, constipation, diarrhea, nausea and vomiting.   Genitourinary: Negative for dyspareunia, dysuria, menstrual problem, pelvic pain, vaginal bleeding, vaginal discharge and vaginal pain.       /64   Ht 158.8 cm (62.5\")   Wt 66.2 kg (146 lb)   LMP 03/12/2018   BMI 26.28 kg/m²     Physical Exam  Vitals reviewed.   Constitutional:       Appearance: She is well-developed.   HENT:      Mouth/Throat:      Dentition: Normal dentition. No dental caries.   Cardiovascular:      Rate and Rhythm: Normal rate and regular rhythm.      Heart sounds: Normal heart sounds.   Pulmonary:      Effort: Pulmonary effort is normal. No respiratory distress.      Breath sounds: Normal breath sounds. No stridor. No wheezing.   Chest:   Breasts:      Right: No inverted nipple, mass, nipple discharge, skin change or tenderness.      Left: No inverted nipple, mass, nipple discharge, skin change or tenderness.       Abdominal:      General: There is no distension.      Palpations: Abdomen is soft. " There is no mass.      Tenderness: There is no abdominal tenderness.   Genitourinary:     Labia:         Right: No rash, tenderness or lesion.         Left: No rash, tenderness or lesion.       Vagina: No vaginal discharge, tenderness or bleeding.      Cervix: No cervical motion tenderness, discharge or friability.      Uterus: Not deviated, not enlarged, not fixed and not tender.       Adnexa:         Right: No mass, tenderness or fullness.          Left: No mass, tenderness or fullness.     Musculoskeletal:         General: No tenderness. Normal range of motion.   Skin:     General: Skin is warm.      Findings: No erythema or rash.   Neurological:      Mental Status: She is alert and oriented to person, place, and time.      Cranial Nerves: No cranial nerve deficit.      Coordination: Coordination normal.   Psychiatric:         Behavior: Behavior normal.         Thought Content: Thought content normal.         Judgment: Judgment normal.            Assessment/Plan    1) GYN HM: Check pap smear. Last MMG 5/2021. SBE demonstrated and encouraged.  Has never had a colonoscopy.  ColoGuard is negative. Due for another ColoGuard  2) :  On HRT and doing well. No PMPB. No vasomotor symptoms. Pt agrees to start weaning this year.   3) Bone health - Weight bearing exercise, dietary calcium recommendations and vitamin D reviewed.   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  Pt has an 12 year old son. Has another daughter that is dairy to a farmer.  7) Choking on liquids: recommend speech pathology for swallow study  8) Follow up prn and 1 year       Diagnoses and all orders for this visit:    Routine gynecological examination  -     POC Urinalysis Dipstick  -     IgP, Aptima HPV    Pap smear, low-risk  -     IgP, Aptima HPV    Special screening examination for human  papillomavirus (HPV)  -     IgP, Aptima HPV    Vasomotor symptoms due to menopause    Colon cancer screening  -     Cologuard - Stool, Per Rectum; Future    Other orders  -     zolpidem CR (AMBIEN CR) 6.25 MG CR tablet; Take 6.25 mg by mouth every night at bedtime.  -     Progesterone (Prometrium) 100 MG capsule; Take 1 capsule by mouth Daily.        Lali Peter DO  4/12/2022  14:17 EDT

## 2022-04-19 LAB
CYTOLOGIST CVX/VAG CYTO: NORMAL
CYTOLOGY CVX/VAG DOC CYTO: NORMAL
CYTOLOGY CVX/VAG DOC THIN PREP: NORMAL
DX ICD CODE: NORMAL
HIV 1 & 2 AB SER-IMP: NORMAL
HPV I/H RISK 4 DNA CVX QL PROBE+SIG AMP: NEGATIVE
Lab: NORMAL
OTHER STN SPEC: NORMAL
STAT OF ADQ CVX/VAG CYTO-IMP: NORMAL

## 2022-05-10 ENCOUNTER — HOSPITAL ENCOUNTER (OUTPATIENT)
Dept: MAMMOGRAPHY | Facility: HOSPITAL | Age: 57
Discharge: HOME | End: 2022-05-10
Admitting: OBSTETRICS & GYNECOLOGY

## 2022-05-10 DIAGNOSIS — Z12.31 ENCOUNTER FOR SCREENING MAMMOGRAM FOR MALIGNANT NEOPLASM OF BREAST: ICD-10-CM

## 2022-05-10 PROCEDURE — 77067 SCR MAMMO BI INCL CAD: CPT

## 2022-05-10 PROCEDURE — 77063 BREAST TOMOSYNTHESIS BI: CPT

## 2022-06-22 RX ORDER — PROGESTERONE 100 MG/1
CAPSULE ORAL
Qty: 90 CAPSULE | Refills: 3 | Status: SHIPPED | OUTPATIENT
Start: 2022-06-22

## 2022-10-14 RX ORDER — ESTRADIOL 1 MG/1
TABLET ORAL
Qty: 90 TABLET | Refills: 0 | Status: SHIPPED | OUTPATIENT
Start: 2022-10-14

## 2023-05-19 RX ORDER — ESTRADIOL 1 MG/1
TABLET ORAL
Qty: 90 TABLET | Refills: 0 | Status: SHIPPED | OUTPATIENT
Start: 2023-05-19 | End: 2023-05-22

## 2023-05-22 RX ORDER — ESTRADIOL 1 MG/1
TABLET ORAL
Qty: 90 TABLET | Refills: 0 | Status: SHIPPED | OUTPATIENT
Start: 2023-05-22

## 2023-05-30 ENCOUNTER — OFFICE VISIT (OUTPATIENT)
Dept: OBSTETRICS AND GYNECOLOGY | Facility: CLINIC | Age: 58
End: 2023-05-30

## 2023-05-30 VITALS
SYSTOLIC BLOOD PRESSURE: 124 MMHG | BODY MASS INDEX: 27.79 KG/M2 | HEIGHT: 62 IN | WEIGHT: 151 LBS | DIASTOLIC BLOOD PRESSURE: 70 MMHG

## 2023-05-30 DIAGNOSIS — Z13.89 SCREENING FOR GENITOURINARY CONDITION: ICD-10-CM

## 2023-05-30 DIAGNOSIS — Z11.51 SPECIAL SCREENING EXAMINATION FOR HUMAN PAPILLOMAVIRUS (HPV): ICD-10-CM

## 2023-05-30 DIAGNOSIS — N95.1 VASOMOTOR SYMPTOMS DUE TO MENOPAUSE: ICD-10-CM

## 2023-05-30 DIAGNOSIS — Z01.419 ROUTINE GYNECOLOGICAL EXAMINATION: Primary | ICD-10-CM

## 2023-05-30 DIAGNOSIS — Z01.419 PAP SMEAR, LOW-RISK: ICD-10-CM

## 2023-05-30 DIAGNOSIS — N88.8 NABOTHIAN CYST: ICD-10-CM

## 2023-05-30 RX ORDER — PROGESTERONE 100 MG/1
100 CAPSULE ORAL
Qty: 90 CAPSULE | Refills: 3 | Status: SHIPPED | OUTPATIENT
Start: 2023-05-30

## 2023-05-30 RX ORDER — ESTRADIOL 1 MG/1
1 TABLET ORAL DAILY
Qty: 90 TABLET | Refills: 3 | Status: SHIPPED | OUTPATIENT
Start: 2023-05-30

## 2023-05-30 NOTE — PROGRESS NOTES
"GYN Annual Exam     CC- Here for annual exam.     Dianna Gould is a 57 y.o. female who presents for annual well woman exam. She is a former patient of Dr. Peter and is new to me. Menopausal since 2018.  Patient is on HRT and doing very well.  She desires to continue estradiol 1 mg p.o. daily and Prometrium 100 mg p.o. nightly. She worked on cutting her HRT back over the past few months. She was doing well on 0.5mg Estradiol and then her hot flashes returned. She has since restarted doing the 1mg daily. She reports I understand the risk of hormones and states, \"for me it is quality of life or quantity.\"     She denies any postmenopausal bleeding.  She denies any vasomotor symptoms.  She has had no change in her past medical history or her past family history.     She is struggling with weight loss. She report she works out and eats \"super healthy.\"  She does not understand why she is gaining weight.       OB History        3    Para   3    Term   3            AB        Living   3       SAB        IAB        Ectopic        Molar        Multiple        Live Births   3              MM/22  Pap:  NIL   Dexa: has not had   Colonoscopy: Declines, reports having a neg cologuard a few years ago   Current contraception: post menopausal status  History of abnormal Pap smear: no  History of abnormal mammogram: no  Family history of uterine, colon or ovarian cancer: no  Family history of breast cancer: no    Health Maintenance   Topic Date Due   • COLORECTAL CANCER SCREENING  Never done   • ZOSTER VACCINE (1 of 2) Never done   • HEPATITIS C SCREENING  Never done   • ANNUAL PHYSICAL  2019   • COVID-19 Vaccine (3 - Booster for Pfizer series) 2021   • TDAP/TD VACCINES (2 - Td or Tdap) 03/10/2022   • Annual Gynecologic Pelvic and Breast Exam  2023   • INFLUENZA VACCINE  2023   • MAMMOGRAM  05/10/2024   • PAP SMEAR  2025   • Pneumococcal Vaccine 0-64  Aged Out       No " "past medical history on file.    No past surgical history on file.      Current Outpatient Medications:   •  estradiol (ESTRACE) 1 MG tablet, TAKE 1 TABLET BY MOUTH EVERY DAY, Disp: 90 tablet, Rfl: 0  •  progesterone (PROMETRIUM) 100 MG capsule, Take 1 capsule by mouth Every Night., Disp: 90 capsule, Rfl: 3  •  zolpidem CR (AMBIEN CR) 6.25 MG CR tablet, Take 1 tablet by mouth every night at bedtime., Disp: , Rfl:   •  Progesterone (PROMETRIUM) 100 MG capsule, TAKE 1 CAPSULE BY MOUTH EVERY NIGHT AT BEDTIME (Patient not taking: Reported on 5/30/2023), Disp: 90 capsule, Rfl: 3    No Known Allergies    Social History     Tobacco Use   • Smoking status: Never   • Smokeless tobacco: Never   Substance Use Topics   • Alcohol use: No   • Drug use: No       Family History   Problem Relation Age of Onset   • No Known Problems Mother    • No Known Problems Father    • No Known Problems Sister    • No Known Problems Brother    • No Known Problems Daughter    • No Known Problems Son    • No Known Problems Maternal Grandmother    • No Known Problems Paternal Grandmother    • No Known Problems Maternal Grandfather    • No Known Problems Paternal Grandfather    • No Known Problems Cousin    • Breast cancer Neg Hx    • Ovarian cancer Neg Hx        Review of Systems   Constitutional: Negative for appetite change, chills, fatigue, fever and unexpected weight change.   Cardiovascular: Negative.    Gastrointestinal: Negative for abdominal distention, abdominal pain, anal bleeding, blood in stool, constipation, diarrhea, nausea and vomiting.   Endocrine: Positive for heat intolerance (hot flashes).   Genitourinary: Negative for dyspareunia, dysuria, menstrual problem, pelvic pain, vaginal bleeding, vaginal discharge and vaginal pain.   Musculoskeletal: Negative.    Psychiatric/Behavioral: Positive for decreased concentration.        Brain fog        /70   Ht 157.5 cm (62\")   Wt 68.5 kg (151 lb)   LMP 03/12/2018   Breastfeeding " No   BMI 27.62 kg/m²     Physical Exam  Vitals reviewed.   Constitutional:       Appearance: She is well-developed.   HENT:      Mouth/Throat:      Dentition: Normal dentition. No dental caries.   Cardiovascular:      Rate and Rhythm: Normal rate and regular rhythm.      Heart sounds: Normal heart sounds.   Pulmonary:      Effort: Pulmonary effort is normal. No respiratory distress.      Breath sounds: Normal breath sounds. No stridor. No wheezing.   Chest:   Breasts:     Right: No inverted nipple, mass, nipple discharge, skin change or tenderness.      Left: No inverted nipple, mass, nipple discharge, skin change or tenderness.   Abdominal:      General: There is no distension.      Palpations: Abdomen is soft. There is no mass.      Tenderness: There is no abdominal tenderness.   Genitourinary:     Labia:         Right: No rash, tenderness or lesion.         Left: No rash, tenderness or lesion.       Vagina: No vaginal discharge, tenderness or bleeding.      Cervix: Erythema present. No cervical motion tenderness, discharge or friability.      Uterus: Not deviated, not enlarged, not fixed and not tender.       Adnexa:         Right: No mass, tenderness or fullness.          Left: No mass, tenderness or fullness.        Comments: Vascularization associated with large nabothian cyst, cervix difficult to visualize d/t cyst   Musculoskeletal:         General: No tenderness. Normal range of motion.   Skin:     General: Skin is warm.      Findings: No erythema or rash.   Neurological:      Mental Status: She is alert and oriented to person, place, and time.      Cranial Nerves: No cranial nerve deficit.      Coordination: Coordination normal.   Psychiatric:         Behavior: Behavior normal.         Thought Content: Thought content normal.         Judgment: Judgment normal.            Assessment/Plan    1) GYN HM: Check pap smear. Check screening MMG.  SBE demonstrated and encouraged.    2) : On HRT and doing well.  No PMPB. No vasomotor symptoms. She attempted to wean and started having hot flashes. She desires to continue Estradiol 1mg and Prometrium 100mg daily. Patient counseled that hormone treatment should be used to help with specific symptoms related to menopause such as hot flashes, night sweats and vaginal atrophy. Hormones should not be given for “general wellbeing” or to avoid aging. The lowest dose hormone that treats symptoms should be used for the shortest about of time possible.  Although estrogen is the most effective treatment for hot flashes, other non hormonal options exist (such as Brisdelle or venlafaxine) and should also be considered.  Anyone with an intact uterus should also receive progestogen to prevent uterine overgrowth that can lead to uterine cancer.  All hormone therapy, whether it is synthetic or bio identical, can lead to increased risk of stroke, heart attack and thromboembolic diseases.  These adverse events are more likely to develop in the first year of use and in patients who are older than the typical menopausal age.  Risks of estrogen dependent cancers such as breast cancer increase with prolonged use.  Attempts to wean hormone therapy should be discussed annually and particularly after three to five years of use.  Any side effects such as vaginal bleeding or pain should be reported immediately.  3) Nabothian cyst- Check TVUS to confirm d/t large nature of cyst.   4) Bone health - Weight bearing exercise, dietary calcium recommendations and vitamin D reviewed.   5) Diet and Exercise discussed  6) Smoking Status: nonsmoker  7) Body mass index is 27.62 kg/m².  8) Colonoscopy: Plans to do cologuard with PCP    Follow up prn and 1 year       Diagnoses and all orders for this visit:    Encounter for  screening, unspecified  -     POC Urinalysis Dipstick    RTO PRN     HERB Ludwig  2023  10:43 EDT

## 2023-05-31 PROBLEM — N88.8 NABOTHIAN CYST: Status: ACTIVE | Noted: 2023-05-31

## 2023-05-31 PROBLEM — Z01.419 ROUTINE GYNECOLOGICAL EXAMINATION: Status: ACTIVE | Noted: 2023-05-31

## 2023-08-30 RX ORDER — ESTRADIOL 1 MG/1
1 TABLET ORAL DAILY
Qty: 90 TABLET | Refills: 3 | Status: SHIPPED | OUTPATIENT
Start: 2023-08-30

## 2023-08-30 RX ORDER — PROGESTERONE 100 MG/1
100 CAPSULE ORAL
Qty: 90 CAPSULE | Refills: 3 | Status: SHIPPED | OUTPATIENT
Start: 2023-08-30

## 2023-09-21 ENCOUNTER — OFFICE VISIT (OUTPATIENT)
Dept: OBSTETRICS AND GYNECOLOGY | Facility: CLINIC | Age: 58
End: 2023-09-21

## 2023-09-21 VITALS
SYSTOLIC BLOOD PRESSURE: 122 MMHG | WEIGHT: 144 LBS | HEIGHT: 62 IN | BODY MASS INDEX: 26.5 KG/M2 | DIASTOLIC BLOOD PRESSURE: 62 MMHG

## 2023-09-21 DIAGNOSIS — N88.8 CERVICAL MASS: Primary | ICD-10-CM

## 2023-09-21 DIAGNOSIS — Z13.89 SCREENING FOR GENITOURINARY CONDITION: ICD-10-CM

## 2023-09-21 DIAGNOSIS — N81.11 CYSTOCELE, MIDLINE: ICD-10-CM

## 2023-09-21 NOTE — PROGRESS NOTES
"Subjective     Chief Complaint   Patient presents with    Vaginal Pain     Pressure,prolapse?       Dianna Gould is a 57 y.o.  whose LMP is Patient's last menstrual period was 2018.. She presents today with c/o prolapse. She reports she has pelvic pressure and urgency that started a few months ago. She noticed feeling something in the vagina when showering. She reports when she sits she is fine. 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     HPI    HPI    The following portions of the patient's history were reviewed and updated as appropriate:vital signs, allergies, current medications, past medical history, past social history, past surgical history, and problem list      Review of Systems     Review of Systems   Genitourinary:  Positive for pelvic pressure and urgency. Negative for vaginal bleeding and vaginal discharge.     Objective      /62   Ht 157.5 cm (62\")   Wt 65.3 kg (144 lb)   LMP 2018   Breastfeeding No   BMI 26.34 kg/m²     Physical Exam    Physical Exam  Vitals and nursing note reviewed.   Constitutional:       Appearance: Normal appearance.   Genitourinary:           Comments: Green- Cervical mass  Red- erosion noted   Musculoskeletal:         General: Normal range of motion.   Skin:     General: Skin is warm and dry.   Neurological:      General: No focal deficit present.      Mental Status: She is alert and oriented to person, place, and time.   Psychiatric:         Mood and Affect: Mood normal.         Behavior: Behavior normal.     Dr. Young into room to examine patient.     Lab Review   Labs: Urinalysis - with micro     Imaging   No data reviewed    Assessment  Diagnoses and all orders for this visit:    1. Cervical mass (Primary)    2. Screening for genitourinary condition  -     POC Urinalysis Dipstick    3. Cystocele, midline      Assessment/Plan:   Cervical mass- Check TVUS. Dr. Young into exam room to examine patient. Plan " cervical biopsy with Dr. Young   Cystocele- Plan to manage cervical mass and then determine if pt's c/o are a result of cystocele or cervical mass causing pressure.     RTO PRN       Lynn Romero, APRN  9/21/2023

## 2023-09-29 ENCOUNTER — OFFICE VISIT (OUTPATIENT)
Dept: OBSTETRICS AND GYNECOLOGY | Facility: CLINIC | Age: 58
End: 2023-09-29
Payer: COMMERCIAL

## 2023-09-29 VITALS
DIASTOLIC BLOOD PRESSURE: 70 MMHG | BODY MASS INDEX: 26.5 KG/M2 | WEIGHT: 144 LBS | HEIGHT: 62 IN | SYSTOLIC BLOOD PRESSURE: 124 MMHG

## 2023-09-29 DIAGNOSIS — N88.8 NABOTHIAN CYST: ICD-10-CM

## 2023-09-29 DIAGNOSIS — Z13.89 SCREENING FOR GENITOURINARY CONDITION: Primary | ICD-10-CM

## 2023-09-29 NOTE — PROGRESS NOTES
"Today here for Nabothian cyst drainage and possible Bx.       Last visit:     Dianna Gould is a 57 y.o.  whose LMP is Patient's last menstrual period was 2018.. She presents today with c/o prolapse. She reports she has pelvic pressure and urgency that started a few months ago. She noticed feeling something in the vagina when showering. She reports when she sits she is fine. 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     HPI    HPI    The following portions of the patient's history were reviewed and updated as appropriate:vital signs, allergies, current medications, past medical history, past social history, past surgical history, and problem list      Review of Systems     Review of Systems   Genitourinary:  Positive for pelvic pressure and urgency. Negative for vaginal bleeding and vaginal discharge.     Objective      /70   Ht 157.5 cm (62\")   Wt 65.3 kg (144 lb)   LMP 2018   Breastfeeding No   BMI 26.34 kg/m²     Physical Exam    Physical Exam  Vitals and nursing note reviewed.   Constitutional:       Appearance: Normal appearance.   Genitourinary:           Comments: Cervical mass size of large grape noted   Musculoskeletal:         General: Normal range of motion.   Skin:     General: Skin is warm and dry.   Neurological:      General: No focal deficit present.      Mental Status: She is alert and oriented to person, place, and time.   Psychiatric:         Mood and Affect: Mood normal.         Behavior: Behavior normal.     I&D of Nabothian in standard fashion. Consent obtained. Lidocaine utilized. Scalpel and drained. Hemostatic with pressure and Monsel's     Assessment  Diagnoses and all orders for this visit:    1. Screening for genitourinary condition (Primary)  -     POC Urinalysis Dipstick    2. Nabothian cyst      Nabothian cyst I&D. Reassurance and recovery discussed.   F/U prn   Return full activities in pelvis in 2 weeks       Kwasi BENTLEY" DO [place]  9/29/2023

## 2024-03-04 ENCOUNTER — TRANSCRIBE ORDERS (OUTPATIENT)
Dept: ADMINISTRATIVE | Facility: HOSPITAL | Age: 59
End: 2024-03-04
Payer: COMMERCIAL

## 2024-03-04 DIAGNOSIS — Z12.31 SCREENING MAMMOGRAM FOR BREAST CANCER: Primary | ICD-10-CM

## 2024-03-18 ENCOUNTER — HOSPITAL ENCOUNTER (OUTPATIENT)
Dept: MAMMOGRAPHY | Facility: HOSPITAL | Age: 59
Discharge: HOME | End: 2024-03-18
Admitting: FAMILY MEDICINE
Payer: COMMERCIAL

## 2024-03-18 DIAGNOSIS — Z12.31 SCREENING MAMMOGRAM FOR BREAST CANCER: ICD-10-CM

## 2024-03-18 PROCEDURE — 77067 SCR MAMMO BI INCL CAD: CPT

## 2024-03-18 PROCEDURE — 77063 BREAST TOMOSYNTHESIS BI: CPT

## 2024-06-03 ENCOUNTER — OFFICE VISIT (OUTPATIENT)
Dept: OBSTETRICS AND GYNECOLOGY | Facility: CLINIC | Age: 59
End: 2024-06-03
Payer: COMMERCIAL

## 2024-06-03 VITALS
DIASTOLIC BLOOD PRESSURE: 60 MMHG | BODY MASS INDEX: 25.98 KG/M2 | HEIGHT: 62 IN | WEIGHT: 141.2 LBS | SYSTOLIC BLOOD PRESSURE: 110 MMHG

## 2024-06-03 DIAGNOSIS — N81.10 CYSTOCELE WITHOUT UTERINE PROLAPSE: ICD-10-CM

## 2024-06-03 DIAGNOSIS — Z01.419 ROUTINE GYNECOLOGICAL EXAMINATION: Primary | ICD-10-CM

## 2024-06-03 DIAGNOSIS — Z11.51 SCREENING FOR HUMAN PAPILLOMAVIRUS (HPV): ICD-10-CM

## 2024-06-03 DIAGNOSIS — Z01.419 WELL WOMAN EXAM WITH ROUTINE GYNECOLOGICAL EXAM: ICD-10-CM

## 2024-06-03 DIAGNOSIS — I83.893 VARICOSE VEINS OF BILATERAL LOWER EXTREMITIES WITH OTHER COMPLICATIONS: ICD-10-CM

## 2024-06-03 DIAGNOSIS — Z12.11 SCREENING FOR COLON CANCER: ICD-10-CM

## 2024-06-03 PROBLEM — N88.8 CERVICAL MASS: Status: RESOLVED | Noted: 2023-09-21 | Resolved: 2024-06-03

## 2024-06-03 PROCEDURE — 81002 URINALYSIS NONAUTO W/O SCOPE: CPT | Performed by: NURSE PRACTITIONER

## 2024-06-03 PROCEDURE — 99213 OFFICE O/P EST LOW 20 MIN: CPT | Performed by: NURSE PRACTITIONER

## 2024-06-03 PROCEDURE — 99396 PREV VISIT EST AGE 40-64: CPT | Performed by: NURSE PRACTITIONER

## 2024-06-03 RX ORDER — ESTRADIOL 1 MG/1
1 TABLET ORAL DAILY
Qty: 90 TABLET | Refills: 3 | Status: SHIPPED | OUTPATIENT
Start: 2024-06-03

## 2024-06-03 RX ORDER — PROGESTERONE 100 MG/1
100 CAPSULE ORAL
COMMUNITY
Start: 2024-06-01 | End: 2024-06-03 | Stop reason: SDUPTHER

## 2024-06-03 RX ORDER — PROGESTERONE 100 MG/1
100 CAPSULE ORAL DAILY
Qty: 90 CAPSULE | Refills: 3 | Status: SHIPPED | OUTPATIENT
Start: 2024-06-03

## 2024-06-03 NOTE — PROGRESS NOTES
GYN Annual Exam     CC- Here for annual exam.     Dianna Gould is a 58 y.o. female who presents for annual well woman exam. Menopausal since 2018.  Patient is on HRT and doing very well.     She denies any postmenopausal bleeding.  She denies any vasomotor symptoms with HRT.  She has had no change in her past medical history or her past family history.  She has tried to wean her HRT and the hot flashes return.     She feels like she a prolapse uterus. She reports after being on her feet at her job she can feel the area when she showers. She does not have difficulty urinating or pelvic pressure.     She c/o a little bump on left on the labia minora. She feels like the area is getting bigger. Denies pain.     She is worried about the vericose veins in her legs.     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       OB History          3    Para   3    Term   3            AB        Living   3         SAB        IAB        Ectopic        Molar        Multiple        Live Births   3              MMG: 3/24  Pap:  NIL/HPV neg   Dexa: has not had   Colonoscopy: Prefers cologuard   Current contraception: post menopausal status  History of abnormal Pap smear: no  History of abnormal mammogram: no  Family history of uterine, colon or ovarian cancer: no  Family history of breast cancer: no    Health Maintenance   Topic Date Due    BMI FOLLOWUP  Never done    COLORECTAL CANCER SCREENING  Never done    ZOSTER VACCINE (1 of 2) Never done    HEPATITIS C SCREENING  Never done    ANNUAL PHYSICAL  2019    TDAP/TD VACCINES (2 - Td or Tdap) 03/10/2022    COVID-19 Vaccine (4 -  season) 2024    Annual Gynecologic Pelvic and Breast Exam  2024    INFLUENZA VACCINE  2024    MAMMOGRAM  2026    PAP SMEAR  2026    Pneumococcal Vaccine 0-64  Aged Out       History reviewed. No pertinent past medical history.    Past Surgical History:   Procedure  "Laterality Date    OVARIAN CYST REMOVAL N/A     09/2023         Current Outpatient Medications:     estradiol (ESTRACE) 1 MG tablet, Take 1 tablet by mouth Daily., Disp: 90 tablet, Rfl: 3    Progesterone (PROMETRIUM) 100 MG capsule, Take 1 capsule by mouth Daily., Disp: 90 capsule, Rfl: 3    zolpidem CR (AMBIEN CR) 6.25 MG CR tablet, Take 1 tablet by mouth every night at bedtime., Disp: , Rfl:     No Known Allergies    Social History     Tobacco Use    Smoking status: Never    Smokeless tobacco: Never   Substance Use Topics    Alcohol use: No    Drug use: No       Family History   Problem Relation Age of Onset    No Known Problems Mother     No Known Problems Father     No Known Problems Sister     No Known Problems Brother     No Known Problems Daughter     No Known Problems Son     No Known Problems Maternal Grandmother     No Known Problems Paternal Grandmother     No Known Problems Maternal Grandfather     No Known Problems Paternal Grandfather     No Known Problems Cousin     Breast cancer Neg Hx     Ovarian cancer Neg Hx        Review of Systems   Constitutional:  Negative for appetite change, chills, fatigue, fever and unexpected weight change.   Cardiovascular: Negative.    Gastrointestinal:  Negative for abdominal distention, abdominal pain, anal bleeding, blood in stool, constipation, diarrhea, nausea and vomiting.   Endocrine: Negative for heat intolerance.   Genitourinary:  Negative for dyspareunia, dysuria, menstrual problem, pelvic pain, vaginal bleeding, vaginal discharge and vaginal pain.   Musculoskeletal: Negative.    Psychiatric/Behavioral:  Negative for decreased concentration.        /60   Ht 157.5 cm (62.01\")   Wt 64 kg (141 lb 3.2 oz)   LMP 03/12/2018   BMI 25.82 kg/m²     Physical Exam  Vitals reviewed.   Constitutional:       Appearance: She is well-developed.   HENT:      Mouth/Throat:      Dentition: Normal dentition. No dental caries.   Cardiovascular:      Rate and Rhythm: " Normal rate and regular rhythm.      Heart sounds: Normal heart sounds.   Pulmonary:      Effort: Pulmonary effort is normal. No respiratory distress.      Breath sounds: Normal breath sounds. No stridor. No wheezing.   Chest:   Breasts:     Right: No inverted nipple, mass, nipple discharge, skin change or tenderness.      Left: No inverted nipple, mass, nipple discharge, skin change or tenderness.   Abdominal:      General: There is no distension.      Palpations: Abdomen is soft. There is no mass.      Tenderness: There is no abdominal tenderness.   Genitourinary:     Labia:         Right: No rash, tenderness or lesion.         Left: No rash, tenderness or lesion.       Vagina: No vaginal discharge, tenderness or bleeding.      Cervix: No cervical motion tenderness, discharge, friability or erythema.      Uterus: Not deviated, not enlarged, not fixed and not tender.       Adnexa:         Right: No mass, tenderness or fullness.          Left: No mass, tenderness or fullness.            Comments: Hemangioma   Cystocele noted     Musculoskeletal:         General: No tenderness. Normal range of motion.   Skin:     General: Skin is warm.      Findings: No erythema or rash.   Neurological:      Mental Status: She is alert and oriented to person, place, and time.      Cranial Nerves: No cranial nerve deficit.      Coordination: Coordination normal.   Psychiatric:         Behavior: Behavior normal.         Thought Content: Thought content normal.         Judgment: Judgment normal.            Assessment/Plan    1) GYN HM: Check pap smear. Check screening MMG.  SBE demonstrated and encouraged.    2) : Desires to continue HRT. She has tried to wean and was unhappy. She understands the risk associated with HRT and desires to continue.   3) Cystocele: Ref to pelvic floor PT  4) Bone health - Weight bearing exercise, dietary calcium recommendations and vitamin D reviewed.   5) Diet and Exercise discussed  6) Smoking Status:  nonsmoker  7) Body mass index is 25.82 kg/m².  8) Colonoscopy: Plans to do cologuard- order placed for cologuard   9) Varicose veins- Ref to vascular surgery     Follow up prn and 1 year     Parts of this document have been copied or forwarded from her previous visits and have been reviewed, updated and edited as indicated.       Diagnoses and all orders for this visit:    Well woman exam with routine gynecological exam    Screening for colon cancer  -     Cologuard - Stool, Per Rectum; Future    Cystocele without uterine prolapse  -     Ambulatory Referral to Physical Therapy    Varicose veins of bilateral lower extremities with other complications  -     Ambulatory Referral to Vascular Surgery    Other orders  -     Discontinue: Progesterone (PROMETRIUM) 100 MG capsule; Take 1 capsule by mouth every night at bedtime. (Patient not taking: Reported on 6/3/2024)  -     Progesterone (PROMETRIUM) 100 MG capsule; Take 1 capsule by mouth Daily.  -     estradiol (ESTRACE) 1 MG tablet; Take 1 tablet by mouth Daily.      RTO PRN     HERB Ludwig  6/3/2024  10:43 EDT

## 2024-06-05 LAB
CYTOLOGIST CVX/VAG CYTO: NORMAL
CYTOLOGY CVX/VAG DOC CYTO: NORMAL
CYTOLOGY CVX/VAG DOC THIN PREP: NORMAL
DX ICD CODE: NORMAL
HPV I/H RISK 4 DNA CVX QL PROBE+SIG AMP: NEGATIVE
Lab: NORMAL
OTHER STN SPEC: NORMAL
STAT OF ADQ CVX/VAG CYTO-IMP: NORMAL

## 2025-05-21 ENCOUNTER — TRANSCRIBE ORDERS (OUTPATIENT)
Dept: ADMINISTRATIVE | Facility: HOSPITAL | Age: 60
End: 2025-05-21
Payer: COMMERCIAL

## 2025-05-21 DIAGNOSIS — Z12.31 SCREENING MAMMOGRAM FOR BREAST CANCER: Primary | ICD-10-CM

## 2025-06-04 ENCOUNTER — HOSPITAL ENCOUNTER (OUTPATIENT)
Dept: MAMMOGRAPHY | Facility: HOSPITAL | Age: 60
Discharge: HOME | End: 2025-06-04
Admitting: FAMILY MEDICINE
Payer: COMMERCIAL

## 2025-06-04 DIAGNOSIS — Z12.31 SCREENING MAMMOGRAM FOR BREAST CANCER: ICD-10-CM

## 2025-06-04 PROCEDURE — 77067 SCR MAMMO BI INCL CAD: CPT

## 2025-06-04 PROCEDURE — 77063 BREAST TOMOSYNTHESIS BI: CPT

## 2025-06-05 ENCOUNTER — OFFICE VISIT (OUTPATIENT)
Dept: OBSTETRICS AND GYNECOLOGY | Facility: CLINIC | Age: 60
End: 2025-06-05
Payer: COMMERCIAL

## 2025-06-05 VITALS
BODY MASS INDEX: 26.75 KG/M2 | HEIGHT: 62 IN | DIASTOLIC BLOOD PRESSURE: 84 MMHG | WEIGHT: 145.38 LBS | SYSTOLIC BLOOD PRESSURE: 138 MMHG

## 2025-06-05 DIAGNOSIS — Z11.51 SPECIAL SCREENING EXAMINATION FOR HUMAN PAPILLOMAVIRUS (HPV): ICD-10-CM

## 2025-06-05 DIAGNOSIS — Z01.419 CERVICAL SMEAR, AS PART OF ROUTINE GYNECOLOGICAL EXAMINATION: ICD-10-CM

## 2025-06-05 DIAGNOSIS — Z01.419 ROUTINE GYNECOLOGICAL EXAMINATION: Primary | ICD-10-CM

## 2025-06-05 DIAGNOSIS — R53.83 FATIGUE, UNSPECIFIED TYPE: ICD-10-CM

## 2025-06-05 DIAGNOSIS — G47.00 INSOMNIA, UNSPECIFIED TYPE: ICD-10-CM

## 2025-06-05 LAB
25(OH)D3+25(OH)D2 SERPL-MCNC: 33 NG/ML (ref 30–100)
HBA1C MFR BLD: 5.4 % (ref 4.8–5.6)
VIT B12 SERPL-MCNC: 931 PG/ML (ref 211–946)

## 2025-06-05 NOTE — PROGRESS NOTES
GYN Annual Exam     CC- Here for annual exam.     Dianna Gould is a 59 y.o. female who presents for annual well woman exam. Menopausal since 2018.  Patient is on HRT and doing very well. Denies VB.     She is sexually active with her .     She is struggling with sleep. She is followed by her PCP for this c/o. She does use Ambien.     She recently had some labs with her PCP. She is asking for this office to look at her results.         OB History          3    Para   3    Term   3            AB        Living   3         SAB        IAB        Ectopic        Molar        Multiple        Live Births   3              MM25 Normal   Pap:  NIL/HPV neg   Dexa: has not had   Cologuard neg     Current contraception: post menopausal status  History of abnormal Pap smear: no  History of abnormal mammogram: no  Family history of uterine, colon or ovarian cancer: no  Family history of breast cancer: no    Health Maintenance   Topic Date Due    Pneumococcal Vaccine 50+ (1 of 1 - PCV) Never done    ZOSTER VACCINE (1 of 2) Never done    HEPATITIS C SCREENING  Never done    ANNUAL PHYSICAL  2019    TDAP/TD VACCINES (2 - Td or Tdap) 03/10/2022    COVID-19 Vaccine (4 - - season) 2024    Annual Gynecologic Pelvic and Breast Exam  2025    INFLUENZA VACCINE  2025    PAP SMEAR  2027    MAMMOGRAM  2027    COLORECTAL CANCER SCREENING  2027       No past medical history on file.    Past Surgical History:   Procedure Laterality Date    OVARIAN CYST REMOVAL N/A     2023         Current Outpatient Medications:     estradiol (ESTRACE) 1 MG tablet, Take 1 tablet by mouth Daily., Disp: 90 tablet, Rfl: 3    Progesterone (PROMETRIUM) 100 MG capsule, Take 1 capsule by mouth Daily., Disp: 90 capsule, Rfl: 3    zolpidem CR (AMBIEN CR) 6.25 MG CR tablet, Take 1 tablet by mouth every night at bedtime., Disp: , Rfl:     promethazine-dextromethorphan  "(PROMETHAZINE-DM) 6.25-15 MG/5ML syrup, Take 5 mL by mouth 4 (Four) Times a Day As Needed for Cough. (Patient not taking: Reported on 6/5/2025), Disp: 180 mL, Rfl: 0    No Known Allergies    Social History     Tobacco Use    Smoking status: Never    Smokeless tobacco: Never   Vaping Use    Vaping status: Never Used   Substance Use Topics    Alcohol use: No    Drug use: No       Family History   Problem Relation Age of Onset    No Known Problems Mother     No Known Problems Father     No Known Problems Sister     No Known Problems Brother     No Known Problems Daughter     No Known Problems Son     No Known Problems Maternal Grandmother     No Known Problems Paternal Grandmother     No Known Problems Maternal Grandfather     No Known Problems Paternal Grandfather     No Known Problems Cousin     Breast cancer Neg Hx     Ovarian cancer Neg Hx        Review of Systems   Constitutional:  Negative for appetite change, chills, fatigue, fever and unexpected weight change.   Cardiovascular: Negative.    Gastrointestinal:  Negative for abdominal distention, abdominal pain, anal bleeding, blood in stool, constipation, diarrhea, nausea and vomiting.   Endocrine: Negative for heat intolerance.   Genitourinary:  Negative for dyspareunia, dysuria, menstrual problem, pelvic pain, vaginal bleeding, vaginal discharge and vaginal pain.   Musculoskeletal: Negative.    Psychiatric/Behavioral:  Negative for decreased concentration.        /84   Ht 157.5 cm (62.01\")   Wt 65.9 kg (145 lb 6 oz)   LMP 03/12/2018   Breastfeeding No   BMI 26.58 kg/m²     Physical Exam  Vitals reviewed.   Constitutional:       Appearance: She is well-developed.   HENT:      Mouth/Throat:      Dentition: Normal dentition. No dental caries.   Cardiovascular:      Rate and Rhythm: Normal rate and regular rhythm.      Heart sounds: Normal heart sounds.   Pulmonary:      Effort: Pulmonary effort is normal. No respiratory distress.      Breath sounds: " Normal breath sounds. No stridor. No wheezing.   Chest:   Breasts:     Right: No inverted nipple, mass, nipple discharge, skin change or tenderness.      Left: No inverted nipple, mass, nipple discharge, skin change or tenderness.   Abdominal:      General: There is no distension.      Palpations: Abdomen is soft. There is no mass.      Tenderness: There is no abdominal tenderness.   Genitourinary:     Labia:         Right: No rash, tenderness or lesion.         Left: No rash, tenderness or lesion.       Vagina: No vaginal discharge, tenderness or bleeding.      Cervix: No cervical motion tenderness, discharge, friability or erythema.      Uterus: Not deviated, not enlarged, not fixed and not tender.       Adnexa:         Right: No mass, tenderness or fullness.          Left: No mass, tenderness or fullness.        Comments:     Musculoskeletal:         General: No tenderness. Normal range of motion.   Skin:     General: Skin is warm.      Findings: No erythema or rash.   Neurological:      Mental Status: She is alert and oriented to person, place, and time.      Cranial Nerves: No cranial nerve deficit.      Coordination: Coordination normal.   Psychiatric:         Behavior: Behavior normal.         Thought Content: Thought content normal.         Judgment: Judgment normal.            Assessment/Plan    1) GYN HM: Check pap smear. Check screening MMG.  SBE demonstrated and encouraged.    2) : Desires to continue HRT. She has tried to wean and was unhappy. She understands the risk associated with HRT and desires to continue.   3) Cystocele: Symptoms improved with pelvic floor PT  4) Bone health - Weight bearing exercise, dietary calcium recommendations and vitamin D reviewed.   5) Body mass index is 26.58 kg/m². Diet and Exercise discussed  6) Smoking Status: nonsmoker  7) Colonoscopy: Cologuard negative 2024  9) Fatigue- Check VitD, Vit B12 and HgbA1C. Pt had TSH and other labs with PCP. She struggles with  insomnia as well. Using Ambien, followed by PCP.  Ref to sleep medicine.   10) Labs- Labs rev'd with patient per request- CBC, cholesterol, and TSH- wnl other than abnormal cholesterol. Again managed by PCP. Pt taking fish oil and exercising.        Parts of this document have been copied or forwarded from her previous visits and have been reviewed, updated and edited as indicated.       Diagnoses and all orders for this visit:    Fatigue, unspecified type  -     Hemoglobin A1c  -     Vitamin B12  -     Vitamin D,25-Hydroxy    Routine gynecological examination  -     POC Urinalysis Dipstick  -     IGP, Apt HPV,rfx 16 / 18,45    Cervical smear, as part of routine gynecological examination  -     IGP, Apt HPV,rfx 16 / 18,45    Special screening examination for human papillomavirus (HPV)  -     IGP, Apt HPV,rfx 16 / 18,45    Insomnia, unspecified type  -     Ambulatory Referral to Sleep Medicine      RTO PRN and 1 year DORON Romero, APRN  6/5/2025  13:31 EDT

## 2025-06-09 LAB
CYTOLOGIST CVX/VAG CYTO: NORMAL
CYTOLOGY CVX/VAG DOC CYTO: NORMAL
CYTOLOGY CVX/VAG DOC THIN PREP: NORMAL
DX ICD CODE: NORMAL
HPV I/H RISK 4 DNA CVX QL PROBE+SIG AMP: NEGATIVE
OTHER STN SPEC: NORMAL
SERVICE CMNT-IMP: NORMAL
STAT OF ADQ CVX/VAG CYTO-IMP: NORMAL